# Patient Record
Sex: FEMALE | Race: WHITE | Employment: FULL TIME | URBAN - METROPOLITAN AREA
[De-identification: names, ages, dates, MRNs, and addresses within clinical notes are randomized per-mention and may not be internally consistent; named-entity substitution may affect disease eponyms.]

---

## 2018-01-15 PROBLEM — I10 ESSENTIAL HYPERTENSION: Status: ACTIVE | Noted: 2018-01-15

## 2018-01-15 PROBLEM — Z86.73 HISTORY OF CVA IN ADULTHOOD: Status: ACTIVE | Noted: 2018-01-15

## 2018-01-15 PROBLEM — M54.16 LUMBAR RADICULOPATHY: Status: ACTIVE | Noted: 2018-01-15

## 2018-01-15 PROBLEM — E11.9 TYPE 2 DIABETES MELLITUS WITHOUT COMPLICATION, WITHOUT LONG-TERM CURRENT USE OF INSULIN (HCC): Status: ACTIVE | Noted: 2018-01-15

## 2018-01-15 PROBLEM — E78.5 HYPERLIPIDEMIA, UNSPECIFIED HYPERLIPIDEMIA TYPE: Status: ACTIVE | Noted: 2018-01-15

## 2018-01-15 PROCEDURE — 82570 ASSAY OF URINE CREATININE: CPT | Performed by: INTERNAL MEDICINE

## 2018-01-15 PROCEDURE — 82043 UR ALBUMIN QUANTITATIVE: CPT | Performed by: INTERNAL MEDICINE

## 2019-04-10 PROBLEM — Z85.42 HISTORY OF ENDOMETRIAL CANCER: Status: ACTIVE | Noted: 2019-04-10

## 2020-05-07 ENCOUNTER — ANESTHESIA (OUTPATIENT)
Dept: SURGERY | Facility: HOSPITAL | Age: 55
DRG: 585 | End: 2020-05-07
Payer: COMMERCIAL

## 2020-05-07 ENCOUNTER — HOSPITAL ENCOUNTER (INPATIENT)
Facility: HOSPITAL | Age: 55
LOS: 2 days | Discharge: HOME | DRG: 585 | End: 2020-05-09
Attending: EMERGENCY MEDICINE | Admitting: INTERNAL MEDICINE
Payer: COMMERCIAL

## 2020-05-07 ENCOUNTER — ANESTHESIA EVENT (OUTPATIENT)
Dept: SURGERY | Facility: HOSPITAL | Age: 55
DRG: 585 | End: 2020-05-07
Payer: COMMERCIAL

## 2020-05-07 DIAGNOSIS — N61.1 ABSCESS OF RIGHT BREAST: ICD-10-CM

## 2020-05-07 DIAGNOSIS — N61.1 BREAST ABSCESS: ICD-10-CM

## 2020-05-07 DIAGNOSIS — E11.9 TYPE 2 DIABETES MELLITUS WITHOUT COMPLICATION, WITHOUT LONG-TERM CURRENT USE OF INSULIN (HCC): ICD-10-CM

## 2020-05-07 DIAGNOSIS — N61.0 CELLULITIS OF RIGHT BREAST: Primary | ICD-10-CM

## 2020-05-07 PROCEDURE — 99285 EMERGENCY DEPT VISIT HI MDM: CPT

## 2020-05-07 PROCEDURE — 0H9T0ZZ DRAINAGE OF RIGHT BREAST, OPEN APPROACH: ICD-10-PCS | Performed by: SURGERY

## 2020-05-07 PROCEDURE — 82962 GLUCOSE BLOOD TEST: CPT

## 2020-05-07 PROCEDURE — 88342 IMHCHEM/IMCYTCHM 1ST ANTB: CPT | Performed by: SURGERY

## 2020-05-07 PROCEDURE — 96372 THER/PROPH/DIAG INJ SC/IM: CPT

## 2020-05-07 PROCEDURE — 85025 COMPLETE CBC W/AUTO DIFF WBC: CPT | Performed by: EMERGENCY MEDICINE

## 2020-05-07 PROCEDURE — 85060 BLOOD SMEAR INTERPRETATION: CPT | Performed by: EMERGENCY MEDICINE

## 2020-05-07 PROCEDURE — 83036 HEMOGLOBIN GLYCOSYLATED A1C: CPT | Performed by: INTERNAL MEDICINE

## 2020-05-07 PROCEDURE — 88305 TISSUE EXAM BY PATHOLOGIST: CPT | Performed by: SURGERY

## 2020-05-07 PROCEDURE — 88304 TISSUE EXAM BY PATHOLOGIST: CPT | Performed by: SURGERY

## 2020-05-07 PROCEDURE — 80048 BASIC METABOLIC PNL TOTAL CA: CPT | Performed by: EMERGENCY MEDICINE

## 2020-05-07 PROCEDURE — 87070 CULTURE OTHR SPECIMN AEROBIC: CPT | Performed by: SURGERY

## 2020-05-07 PROCEDURE — 87205 SMEAR GRAM STAIN: CPT | Performed by: SURGERY

## 2020-05-07 PROCEDURE — 96374 THER/PROPH/DIAG INJ IV PUSH: CPT

## 2020-05-07 RX ORDER — MIDAZOLAM HYDROCHLORIDE 1 MG/ML
INJECTION INTRAMUSCULAR; INTRAVENOUS AS NEEDED
Status: DISCONTINUED | OUTPATIENT
Start: 2020-05-07 | End: 2020-05-07 | Stop reason: SURG

## 2020-05-07 RX ORDER — VANCOMYCIN 2 GRAM/500 ML IN 0.9 % SODIUM CHLORIDE INTRAVENOUS
25 ONCE
Status: COMPLETED | OUTPATIENT
Start: 2020-05-07 | End: 2020-05-08

## 2020-05-07 RX ORDER — MORPHINE SULFATE 4 MG/ML
2 INJECTION, SOLUTION INTRAMUSCULAR; INTRAVENOUS EVERY 10 MIN PRN
Status: DISCONTINUED | OUTPATIENT
Start: 2020-05-07 | End: 2020-05-07 | Stop reason: HOSPADM

## 2020-05-07 RX ORDER — MORPHINE SULFATE 10 MG/ML
6 INJECTION, SOLUTION INTRAMUSCULAR; INTRAVENOUS EVERY 10 MIN PRN
Status: DISCONTINUED | OUTPATIENT
Start: 2020-05-07 | End: 2020-05-07 | Stop reason: HOSPADM

## 2020-05-07 RX ORDER — HYDROMORPHONE HYDROCHLORIDE 1 MG/ML
0.2 INJECTION, SOLUTION INTRAMUSCULAR; INTRAVENOUS; SUBCUTANEOUS EVERY 5 MIN PRN
Status: DISCONTINUED | OUTPATIENT
Start: 2020-05-07 | End: 2020-05-07 | Stop reason: HOSPADM

## 2020-05-07 RX ORDER — HEPARIN SODIUM 5000 [USP'U]/ML
5000 INJECTION, SOLUTION INTRAVENOUS; SUBCUTANEOUS EVERY 12 HOURS SCHEDULED
Status: DISCONTINUED | OUTPATIENT
Start: 2020-05-08 | End: 2020-05-09

## 2020-05-07 RX ORDER — ASPIRIN 81 MG/1
81 TABLET ORAL DAILY
Status: DISCONTINUED | OUTPATIENT
Start: 2020-05-07 | End: 2020-05-09

## 2020-05-07 RX ORDER — INSULIN ASPART 100 [IU]/ML
0.15 INJECTION, SOLUTION INTRAVENOUS; SUBCUTANEOUS ONCE
Status: COMPLETED | OUTPATIENT
Start: 2020-05-07 | End: 2020-05-07

## 2020-05-07 RX ORDER — MORPHINE SULFATE 4 MG/ML
4 INJECTION, SOLUTION INTRAMUSCULAR; INTRAVENOUS EVERY 10 MIN PRN
Status: DISCONTINUED | OUTPATIENT
Start: 2020-05-07 | End: 2020-05-07 | Stop reason: HOSPADM

## 2020-05-07 RX ORDER — HYDROMORPHONE HYDROCHLORIDE 1 MG/ML
0.4 INJECTION, SOLUTION INTRAMUSCULAR; INTRAVENOUS; SUBCUTANEOUS EVERY 5 MIN PRN
Status: DISCONTINUED | OUTPATIENT
Start: 2020-05-07 | End: 2020-05-07 | Stop reason: HOSPADM

## 2020-05-07 RX ORDER — BUPIVACAINE HYDROCHLORIDE 2.5 MG/ML
INJECTION, SOLUTION EPIDURAL; INFILTRATION; INTRACAUDAL AS NEEDED
Status: DISCONTINUED | OUTPATIENT
Start: 2020-05-07 | End: 2020-05-07

## 2020-05-07 RX ORDER — PROCHLORPERAZINE EDISYLATE 5 MG/ML
5 INJECTION INTRAMUSCULAR; INTRAVENOUS ONCE AS NEEDED
Status: DISCONTINUED | OUTPATIENT
Start: 2020-05-07 | End: 2020-05-07 | Stop reason: HOSPADM

## 2020-05-07 RX ORDER — SODIUM CHLORIDE, SODIUM LACTATE, POTASSIUM CHLORIDE, CALCIUM CHLORIDE 600; 310; 30; 20 MG/100ML; MG/100ML; MG/100ML; MG/100ML
INJECTION, SOLUTION INTRAVENOUS CONTINUOUS PRN
Status: DISCONTINUED | OUTPATIENT
Start: 2020-05-07 | End: 2020-05-07 | Stop reason: SURG

## 2020-05-07 RX ORDER — SODIUM CHLORIDE, SODIUM LACTATE, POTASSIUM CHLORIDE, CALCIUM CHLORIDE 600; 310; 30; 20 MG/100ML; MG/100ML; MG/100ML; MG/100ML
INJECTION, SOLUTION INTRAVENOUS CONTINUOUS
Status: DISCONTINUED | OUTPATIENT
Start: 2020-05-07 | End: 2020-05-07 | Stop reason: HOSPADM

## 2020-05-07 RX ORDER — HYDROCODONE BITARTRATE AND ACETAMINOPHEN 5; 325 MG/1; MG/1
2 TABLET ORAL EVERY 4 HOURS PRN
Status: DISCONTINUED | OUTPATIENT
Start: 2020-05-07 | End: 2020-05-09

## 2020-05-07 RX ORDER — DEXTROSE MONOHYDRATE 25 G/50ML
50 INJECTION, SOLUTION INTRAVENOUS
Status: DISCONTINUED | OUTPATIENT
Start: 2020-05-07 | End: 2020-05-07

## 2020-05-07 RX ORDER — HYDROCODONE BITARTRATE AND ACETAMINOPHEN 5; 325 MG/1; MG/1
1 TABLET ORAL AS NEEDED
Status: DISCONTINUED | OUTPATIENT
Start: 2020-05-07 | End: 2020-05-07 | Stop reason: HOSPADM

## 2020-05-07 RX ORDER — HALOPERIDOL 5 MG/ML
0.25 INJECTION INTRAMUSCULAR ONCE AS NEEDED
Status: DISCONTINUED | OUTPATIENT
Start: 2020-05-07 | End: 2020-05-07 | Stop reason: HOSPADM

## 2020-05-07 RX ORDER — DEXTROSE MONOHYDRATE 25 G/50ML
50 INJECTION, SOLUTION INTRAVENOUS
Status: DISCONTINUED | OUTPATIENT
Start: 2020-05-07 | End: 2020-05-07 | Stop reason: HOSPADM

## 2020-05-07 RX ORDER — HYDROCODONE BITARTRATE AND ACETAMINOPHEN 5; 325 MG/1; MG/1
1 TABLET ORAL EVERY 4 HOURS PRN
Status: DISCONTINUED | OUTPATIENT
Start: 2020-05-07 | End: 2020-05-09

## 2020-05-07 RX ORDER — ENOXAPARIN SODIUM 100 MG/ML
40 INJECTION SUBCUTANEOUS DAILY
Status: DISCONTINUED | OUTPATIENT
Start: 2020-05-08 | End: 2020-05-07

## 2020-05-07 RX ORDER — ONDANSETRON 2 MG/ML
INJECTION INTRAMUSCULAR; INTRAVENOUS AS NEEDED
Status: DISCONTINUED | OUTPATIENT
Start: 2020-05-07 | End: 2020-05-07 | Stop reason: SURG

## 2020-05-07 RX ORDER — ONDANSETRON 2 MG/ML
4 INJECTION INTRAMUSCULAR; INTRAVENOUS ONCE AS NEEDED
Status: DISCONTINUED | OUTPATIENT
Start: 2020-05-07 | End: 2020-05-07 | Stop reason: HOSPADM

## 2020-05-07 RX ORDER — HEPARIN SODIUM 5000 [USP'U]/ML
5000 INJECTION, SOLUTION INTRAVENOUS; SUBCUTANEOUS EVERY 12 HOURS
Status: DISCONTINUED | OUTPATIENT
Start: 2020-05-08 | End: 2020-05-07

## 2020-05-07 RX ORDER — NALOXONE HYDROCHLORIDE 0.4 MG/ML
80 INJECTION, SOLUTION INTRAMUSCULAR; INTRAVENOUS; SUBCUTANEOUS AS NEEDED
Status: DISCONTINUED | OUTPATIENT
Start: 2020-05-07 | End: 2020-05-07 | Stop reason: HOSPADM

## 2020-05-07 RX ORDER — DEXAMETHASONE SODIUM PHOSPHATE 4 MG/ML
VIAL (ML) INJECTION AS NEEDED
Status: DISCONTINUED | OUTPATIENT
Start: 2020-05-07 | End: 2020-05-07 | Stop reason: SURG

## 2020-05-07 RX ORDER — HYDROMORPHONE HYDROCHLORIDE 1 MG/ML
0.6 INJECTION, SOLUTION INTRAMUSCULAR; INTRAVENOUS; SUBCUTANEOUS EVERY 5 MIN PRN
Status: DISCONTINUED | OUTPATIENT
Start: 2020-05-07 | End: 2020-05-07 | Stop reason: HOSPADM

## 2020-05-07 RX ORDER — LIDOCAINE HYDROCHLORIDE 10 MG/ML
INJECTION, SOLUTION EPIDURAL; INFILTRATION; INTRACAUDAL; PERINEURAL AS NEEDED
Status: DISCONTINUED | OUTPATIENT
Start: 2020-05-07 | End: 2020-05-07 | Stop reason: SURG

## 2020-05-07 RX ORDER — ATORVASTATIN CALCIUM 10 MG/1
10 TABLET, FILM COATED ORAL EVERY EVENING
Status: DISCONTINUED | OUTPATIENT
Start: 2020-05-07 | End: 2020-05-09

## 2020-05-07 RX ORDER — LISINOPRIL 5 MG/1
5 TABLET ORAL DAILY
Status: DISCONTINUED | OUTPATIENT
Start: 2020-05-08 | End: 2020-05-09

## 2020-05-07 RX ORDER — HYDROCODONE BITARTRATE AND ACETAMINOPHEN 5; 325 MG/1; MG/1
2 TABLET ORAL AS NEEDED
Status: DISCONTINUED | OUTPATIENT
Start: 2020-05-07 | End: 2020-05-07 | Stop reason: HOSPADM

## 2020-05-07 RX ORDER — ACETAMINOPHEN 325 MG/1
650 TABLET ORAL EVERY 4 HOURS PRN
Status: DISCONTINUED | OUTPATIENT
Start: 2020-05-07 | End: 2020-05-09

## 2020-05-07 RX ORDER — SODIUM CHLORIDE 0.9 % (FLUSH) 0.9 %
3 SYRINGE (ML) INJECTION AS NEEDED
Status: DISCONTINUED | OUTPATIENT
Start: 2020-05-07 | End: 2020-05-09

## 2020-05-07 RX ADMIN — SODIUM CHLORIDE, SODIUM LACTATE, POTASSIUM CHLORIDE, CALCIUM CHLORIDE: 600; 310; 30; 20 INJECTION, SOLUTION INTRAVENOUS at 16:44:00

## 2020-05-07 RX ADMIN — DEXAMETHASONE SODIUM PHOSPHATE 4 MG: 4 MG/ML VIAL (ML) INJECTION at 16:01:00

## 2020-05-07 RX ADMIN — LIDOCAINE HYDROCHLORIDE 50 MG: 10 INJECTION, SOLUTION EPIDURAL; INFILTRATION; INTRACAUDAL; PERINEURAL at 15:56:00

## 2020-05-07 RX ADMIN — SODIUM CHLORIDE, SODIUM LACTATE, POTASSIUM CHLORIDE, CALCIUM CHLORIDE: 600; 310; 30; 20 INJECTION, SOLUTION INTRAVENOUS at 15:30:00

## 2020-05-07 RX ADMIN — ONDANSETRON 4 MG: 2 INJECTION INTRAMUSCULAR; INTRAVENOUS at 16:18:00

## 2020-05-07 RX ADMIN — MIDAZOLAM HYDROCHLORIDE 2 MG: 1 INJECTION INTRAMUSCULAR; INTRAVENOUS at 15:56:00

## 2020-05-07 NOTE — CONSULTS
[facility] AND CLINICS  [facility] Infectious Disease  Report of Consultation    Brisas [address] Patient Status:  Inpatient    1965 MRN [id number]   Location [facility] 5SW/SE Attending Azalia Manual, [address] [facility] Day # 0 PCP MD SONI Benítez atorvastatin (LIPITOR) tab 10 mg, 10 mg, Oral, QPM  •  [START ON 5/8/2020] lisinopril tab 5 mg, 5 mg, Oral, Daily  •  Normal Saline Flush 0.9 % injection 3 mL, 3 mL, Intravenous, PRN  •  glucose (DEX4) oral liquid 15 g, 15 g, Oral, Q15 Min PRN **OR** Gluco thyroid disorder. Remainder of 12 point review of systems otherwise negative.     Vital signs in last 24 hours:  Patient Vitals for the past 24 hrs:   BP Temp Temp src Pulse Resp SpO2 Height Weight   05/07/20 1317 130/83 98.3 °F (36.8 °C) Oral 97 18 97 % regimen and follow-up ultrasound in 6 weeks after completion of antibiotics to evaluate for resolution of abscess, and enlarged lymph node. Assessment and Plan:    1.   Complicated, refractory R breast abscess  - h/o cultures 4/23 with GBS, failed to res

## 2020-05-07 NOTE — H&P
TAYLOR Sullivan 59 Patient Status:  Emergency    1965 MRN [id number]   Location [address] Attending Dominick Hidalgo MD   Hosp Day # 0 PCP Yi Adams MD     Date: Allergies: No Known Allergies  (Not in a hospital admission)      Review of Systems:   A comprehensive 12 point review of systems was otherwise negative, aside from what's stated above.     Physical Exam:   Vital Signs:  Blood pressure (!) 134/92, pulse 9 to assess efficacy of antibiotic therapy or as clinically indicated is recommended. Patient was informed of the examination results and recommendations upon completion of the study. This was interpreted by Michelel Wilson. Mariam Arita M.D.           Assessment/Plan:

## 2020-05-07 NOTE — ED PROVIDER NOTES
Patient Seen in: [facility] AND [facility] Emergency Department      History   Patient presents with:  Cellulitis    Stated Complaint: breast infection    HPI    51-year-old female with history of hypertension, diabetes, hyperlipidemia, and prior CVA presents wi systems reviewed and negative except as noted above.     Physical Exam     ED Triage Vitals   BP 05/07/20 1123 (!) 140/107   Pulse 05/07/20 1123 100   Resp 05/07/20 1123 16   Temp 05/07/20 1121 98.7 °F (37.1 °C)   Temp src 05/07/20 1121 Oral   SpO2 05/07/20 ---------                               -----------         ------                     CBC W/ DIFFERENTIAL[[id number]]          Abnormal            Preliminary result           Please view results for these tests on the individual orders.

## 2020-05-07 NOTE — ED NOTES
Orders for admission, patient is aware of plan and ready to go upstairs. Any questions, please call ED CYDNEY nathan  at extension [phone]. Alert and oriented x4 from home, ambulatory. Right breast cellulitis not improved after 2 courses of antibiotics at home.

## 2020-05-07 NOTE — ED INITIAL ASSESSMENT (HPI)
Sent by Dr. Art Archer for worsening right breast cellulitis. Patient with these issues x 3 weeks, completed 2 rounds of abx. Denies fever, drainage.  Pain 6/10

## 2020-05-07 NOTE — CONSULTS
[facility]    Report of Consultation    Brisas [address]80 Patient Status:  Inpatient    1965 MRN [id number]   Location [facility] 5SW/SE Attending Litzy Munoz, DO   Hosp Day # 0 PCP Kevyn Sanchez MD     Date of Admiss Oral, Daily  •  Normal Saline Flush 0.9 % injection 3 mL, 3 mL, Intravenous, PRN  •  glucose (DEX4) oral liquid 15 g, 15 g, Oral, Q15 Min PRN **OR** Glucose-Vitamin C (DEX-4) chewable tab 4 tablet, 4 tablet, Oral, Q15 Min PRN **OR** dextrose 50 % injection largest most medial and almost just superior to nipple  Areas all tender  Neurologic: Cranial nerves are grossly intact. Motor strength and sensory examination is grossly normal.  No focal neurologic deficit.     Laboratory Data:  Lab Results   Component V

## 2020-05-07 NOTE — ED NOTES
Spoke with dolores garcia on floor, informed of pt just receiving insulin sq, she will check blood glucose upstairs. Transport took pt to floor.

## 2020-05-07 NOTE — ANESTHESIA PREPROCEDURE EVALUATION
Anesthesia PreOp Note    HPI:     Rachelle Villela is a 47year old female who presents for preoperative consultation requested by: Erlinda Stahl MD    Date of Surgery: 5/7/2020    Procedure(s):  EXTREMITY UPPER INCISION & DRAINAGE  Indication: Breast abscess daily., Disp: 30 tablet, Rfl: 5, 5/7/2020 at Unknown time  lisinopril 5 MG Oral Tab, Take 1 tablet (5 mg total) by mouth daily. , Disp: 30 tablet, Rfl: 11, 5/7/2020 at Unknown time  metFORMIN HCl 1000 MG Oral Tab, Take 1 tablet (1,000 mg total) by mouth 2 ( 1-11 Units, 1-11 Units, Subcutaneous, 4 times per day, Galina White, DO    No current [facility]-ordered outpatient medications on file.         Seafood                 HIVES    Family History   Problem Relation Age of Onset   • Diabetes Mother    • Other (cv 05/07/2020    HCT 46.4 05/07/2020    MCV 83.6 05/07/2020    MCH 29.4 05/07/2020    MCHC 35.1 05/07/2020    RDW 12.6 05/07/2020    .0 (H) 05/07/2020     Lab Results   Component Value Date     (L) 05/07/2020    K 4.3 05/07/2020     05/07/2

## 2020-05-07 NOTE — BRIEF OP NOTE
Pre-Operative Diagnosis:  Right Breast abscess [N61.1]     Post-Operative Diagnosis: right breast abscess, complex     Procedure Performed:   Procedure(s):  incision and drainage right breast abscess  Biopsy of right breast    Surgeon(s) and Role:     * Ha

## 2020-05-07 NOTE — ANESTHESIA POSTPROCEDURE EVALUATION
Patient: Desire Cordova    Procedure Summary     Date:  05/07/20 Room / Location:  [facility] OR  / [facility] OR    Anesthesia Start:  0802 Anesthesia Stop:      Procedure:  EXTREMITY UPPER INCISION & DRAINAGE (Right ) Diagnosis:       Breast abscess      (Br

## 2020-05-08 PROCEDURE — 82962 GLUCOSE BLOOD TEST: CPT

## 2020-05-08 PROCEDURE — 80048 BASIC METABOLIC PNL TOTAL CA: CPT | Performed by: SURGERY

## 2020-05-08 PROCEDURE — 85025 COMPLETE CBC W/AUTO DIFF WBC: CPT | Performed by: SURGERY

## 2020-05-08 PROCEDURE — 83735 ASSAY OF MAGNESIUM: CPT | Performed by: SURGERY

## 2020-05-08 RX ORDER — DEXTROSE MONOHYDRATE 25 G/50ML
50 INJECTION, SOLUTION INTRAVENOUS
Status: DISCONTINUED | OUTPATIENT
Start: 2020-05-08 | End: 2020-05-09

## 2020-05-08 NOTE — CM/SW NOTE
Per nursing rounds, pt is from home, independent at baseline. Per ID note/Dr. Janelle Collier, cultures are still pending, tentative plan is to discharge on PO abx. SW/CM to remain available for support and/or discharge planning.      [address] Reji Silva, MELISSA X1

## 2020-05-08 NOTE — PROGRESS NOTES
[facility] AND [facility] Infectious Disease  Progress Note    Jailene Lagunas Patient Status:  Inpatient    1965 MRN [id number]   Location [facility] 5SW/SE Attending Donavan David, [address] [facility] Day # 1 PCP Diana Vaughan MD     Subjective: after completion of antibiotics to evaluate for resolution of abscess, and enlarged lymph node.     Assessment and Plan:     1.   Complicated, refractory R breast abscess  - h/o cultures 4/23 with GBS, failed to respond to p.o. augmentin  - s/p aspiration o

## 2020-05-08 NOTE — PLAN OF CARE
Problem: Patient Centered Care  Goal: Patient preferences are identified and integrated in the patient's plan of care  Description  Interventions:  - What would you like us to know as we care for you?  \"I lost my mother not too long ago\"  - Provide time

## 2020-05-08 NOTE — PROGRESS NOTES
[place] [facility] - [facility]    Progress Note    Brisas [address] Patient Status:  Inpatient    1965 MRN [id number]   Location [facility] 5SW/SE Attending Giovanni Morgan, [address] [facility] Day # 1 PCP Yen Disla MD            Subjective:     PT cellultis]  Pt still with fair amt pain  prob discharge Mustapha VIGIL [facility]  General Surgery  [facility]  5/8/2020  12:47 PM

## 2020-05-08 NOTE — PROGRESS NOTES
[facility] Hospitalist Progress Note   CC: follow-up hospital admission    SUBJECTIVE:  Interval History:   - mild pain   - still draining   - no fevers, chills    - was off dm meds for months, recently restarted, is interested in long acting insulin for home  105   CO2 22.0 26.0   BUN 13 9   MG  --  1.7       Coagulation:   No results found for: PROTIME, INR, PTT    ASSESSMENT/PLAN:  Codie Vasquez is a(n) 47year old female w/ PMHx of CVA (no deficits), DM2, HTN, Obesity BMI 38, hx of endometrial CA s/p

## 2020-05-08 NOTE — DIABETES ED
[facility]    Diabetes Education  Note    Brisas 8080 Patient Status:  Inpatient   1965 MRN [id number]  Location [facility] 5SW/SE Attending Jimena Angel Day # 1 PCP Vincent Villasenor MD    Reason for Visit:M Ye Burden, RN  Inpatient Diabetes Educator  5/8/2020  3:41 PM

## 2020-05-09 VITALS
OXYGEN SATURATION: 95 % | RESPIRATION RATE: 16 BRPM | WEIGHT: 211.88 LBS | BODY MASS INDEX: 38.99 KG/M2 | HEIGHT: 62 IN | DIASTOLIC BLOOD PRESSURE: 87 MMHG | SYSTOLIC BLOOD PRESSURE: 145 MMHG | HEART RATE: 79 BPM | TEMPERATURE: 98 F

## 2020-05-09 PROCEDURE — 82962 GLUCOSE BLOOD TEST: CPT

## 2020-05-09 PROCEDURE — 85025 COMPLETE CBC W/AUTO DIFF WBC: CPT | Performed by: SURGERY

## 2020-05-09 PROCEDURE — 80048 BASIC METABOLIC PNL TOTAL CA: CPT | Performed by: SURGERY

## 2020-05-09 PROCEDURE — 83735 ASSAY OF MAGNESIUM: CPT | Performed by: SURGERY

## 2020-05-09 RX ORDER — HYDROCODONE BITARTRATE AND ACETAMINOPHEN 5; 325 MG/1; MG/1
1 TABLET ORAL EVERY 6 HOURS PRN
Qty: 10 TABLET | Refills: 0 | Status: SHIPPED | OUTPATIENT
Start: 2020-05-09 | End: 2020-11-13 | Stop reason: ALTCHOICE

## 2020-05-09 RX ORDER — BLOOD-GLUCOSE METER
EACH MISCELLANEOUS
Qty: 1 KIT | Refills: 0 | Status: SHIPPED | OUTPATIENT
Start: 2020-05-09 | End: 2021-03-03

## 2020-05-09 RX ORDER — CEFUROXIME AXETIL 500 MG/1
500 TABLET ORAL 2 TIMES DAILY
Qty: 20 TABLET | Refills: 0 | Status: SHIPPED | OUTPATIENT
Start: 2020-05-09 | End: 2020-05-19

## 2020-05-09 RX ORDER — BLOOD SUGAR DIAGNOSTIC
STRIP MISCELLANEOUS
Qty: 50 STRIP | Refills: 6 | Status: SHIPPED | OUTPATIENT
Start: 2020-05-09 | End: 2021-03-03

## 2020-05-09 NOTE — PLAN OF CARE
Problem: Patient Centered Care  Goal: Patient preferences are identified and integrated in the patient's plan of care  Description  Interventions:  - What would you like us to know as we care for you?  I am independent   - Provide timely, complete, and ac surgeon.

## 2020-05-09 NOTE — PLAN OF CARE
Pt being discharged with insulin. New to insulin. Seen by Diabetes educator today. Pt did own injection with minimal direction. Have pt check own blood sugars.      Problem: Diabetes/Glucose Control  Goal: Glucose maintained within prescribed range  Descrip

## 2020-05-09 NOTE — PLAN OF CARE
Problem: Patient Centered Care  Goal: Patient preferences are identified and integrated in the patient's plan of care  Description  Interventions:  - What would you like us to know as we care for you?  I am independent   - Provide timely, complete, and ac

## 2020-05-09 NOTE — DISCHARGE SUMMARY
[place] [facility] - [facility]    Discharge Summary    Brisas 8080 Patient Status:  Inpatient    1965 MRN [id number]   Location [facility] 5SW/SE Attending Conrad Calvillo, [address] [facility] Day # 2 PCP James Gupta MD     Date of Admission:  up      Hx of CVA - no deficits   - aspirin, statin, bg control      DM2 - uncontrolled   - a1c 12.0  - she was off of her hypoglycemic agents Clayborn Young and metformin) for months, then restarted recently   - we discussed options of lifestyle plus additional noted.  Integument: No lesions. No erythema. Psychiatric: Appropriate mood and affect.     Discharge Plan:   Discharge Condition: Good    Current Discharge Medication List    New Orders    Cefuroxime Axetil 500 MG Oral Tab  Take 1 tablet (500 mg total) by nightly. Check sugars first thing in the morning, if greater than 200 for 3 consecutive days, increase your lantus to 28 units nightly. If greater than 300 for 3 consecutive days, increase to 32units nightly. If greater than 400, please call Dr. Andrei Man.

## 2020-05-09 NOTE — PROGRESS NOTES
[place] [facility] - [facility]    Progress Note    Brisas 8080 Patient Status:  Inpatient    1965 MRN [id number]   Location [facility] 5SW/SE Attending Azalia Toledo, 160[address] [facility] Day # 2 PCP Rin Gamino MD            Subjective:     mi cellultis]  Pain better  Wound Good  Home today        ,Donal Gottron MD [facility]  General Surgery  [facility]  5/9/2020  8:00 AM

## 2020-05-09 NOTE — PROGRESS NOTES
[facility] AND [facility] Infectious Disease Progress Note    Geanie Moment Patient Status:  Inpatient    1965 MRN [id number]   Location [facility] 5SW/SE Attending Loulou Barksdale, 160[address] [facility] Day # 2 PCP Naina Chiang MD     Subjective: oz (96.1 kg), SpO2 95 %. Temp (24hrs), Av.2 °F (36.8 °C), Min:97.9 °F (36.6 °C), Max:98.6 °F (37 °C)      HEENT: Exam is unremarkable. Without scleral icterus. Mucous membranes are moist. PERRLA. Oropharynx is clear.   Neck: No tenderness to palpit

## 2020-05-11 ENCOUNTER — TELEPHONE (OUTPATIENT)
Dept: MEDSURG UNIT | Facility: HOSPITAL | Age: 55
End: 2020-05-11

## 2020-05-13 NOTE — OPERATIVE REPORT
[facility]    PATIENT'S NAME: Fililakia Luis   ATTENDING PHYSICIAN: Mulu Cotter DO   OPERATING PHYSICIAN: Jose Cruz Meehan MD   PATIENT ACCOUNT#:   [id number]    LOCATION:  [address] [place] [address] #:   [id number]       DATE OF BIRTH:  [birth year] Multiple biopsies were taken. Appeared to be most likely granulation tissue that had been there for a little while. We inspected. There were no other masses.   After hemostasis was assured, after vigorous irrigation, we placed a Penrose drain through the

## 2020-10-26 ENCOUNTER — HOSPITAL ENCOUNTER (OUTPATIENT)
Dept: ULTRASOUND IMAGING | Facility: HOSPITAL | Age: 55
Discharge: HOME | End: 2020-10-26
Attending: SURGERY
Payer: COMMERCIAL

## 2020-10-26 ENCOUNTER — HOSPITAL ENCOUNTER (OUTPATIENT)
Dept: MAMMOGRAPHY | Facility: HOSPITAL | Age: 55
Discharge: HOME | End: 2020-10-26
Attending: SURGERY
Payer: COMMERCIAL

## 2020-10-26 DIAGNOSIS — N61.1 ABSCESS OF RIGHT BREAST: ICD-10-CM

## 2020-10-26 DIAGNOSIS — N61.0 CELLULITIS OF RIGHT BREAST: ICD-10-CM

## 2020-10-26 PROCEDURE — 76642 ULTRASOUND BREAST LIMITED: CPT | Performed by: SURGERY

## 2020-10-26 PROCEDURE — 77062 BREAST TOMOSYNTHESIS BI: CPT | Performed by: SURGERY

## 2020-10-26 PROCEDURE — 77066 DX MAMMO INCL CAD BI: CPT | Performed by: SURGERY

## 2021-03-03 PROBLEM — R20.0 NUMBNESS AND TINGLING IN LEFT HAND: Status: ACTIVE | Noted: 2021-03-03

## 2021-03-03 PROBLEM — N61.0 CELLULITIS OF BREAST: Status: RESOLVED | Noted: 2020-05-07 | Resolved: 2021-03-03

## 2021-03-03 PROBLEM — R20.2 NUMBNESS AND TINGLING IN LEFT HAND: Status: ACTIVE | Noted: 2021-03-03

## 2021-03-03 PROBLEM — N61.0 CELLULITIS OF RIGHT BREAST: Status: RESOLVED | Noted: 2020-05-07 | Resolved: 2021-03-03

## 2021-03-03 PROBLEM — N61.1 ABSCESS OF RIGHT BREAST: Status: RESOLVED | Noted: 2020-05-07 | Resolved: 2021-03-03

## 2021-04-03 ENCOUNTER — HOSPITAL ENCOUNTER (INPATIENT)
Facility: HOSPITAL | Age: 56
LOS: 3 days | Discharge: HOME | DRG: 177 | End: 2021-04-07
Attending: EMERGENCY MEDICINE | Admitting: HOSPITALIST
Payer: COMMERCIAL

## 2021-04-03 ENCOUNTER — APPOINTMENT (OUTPATIENT)
Dept: ULTRASOUND IMAGING | Facility: HOSPITAL | Age: 56
DRG: 177 | End: 2021-04-03
Attending: HOSPITALIST
Payer: COMMERCIAL

## 2021-04-03 ENCOUNTER — APPOINTMENT (OUTPATIENT)
Dept: CT IMAGING | Facility: HOSPITAL | Age: 56
DRG: 177 | End: 2021-04-03
Attending: EMERGENCY MEDICINE
Payer: COMMERCIAL

## 2021-04-03 ENCOUNTER — APPOINTMENT (OUTPATIENT)
Dept: GENERAL RADIOLOGY | Facility: HOSPITAL | Age: 56
DRG: 177 | End: 2021-04-03
Payer: COMMERCIAL

## 2021-04-03 DIAGNOSIS — J96.00 ACUTE RESPIRATORY FAILURE DUE TO COVID-19 (HCC): Primary | ICD-10-CM

## 2021-04-03 DIAGNOSIS — J12.82 PNEUMONIA DUE TO COVID-19 VIRUS: ICD-10-CM

## 2021-04-03 DIAGNOSIS — U07.1 ACUTE RESPIRATORY FAILURE DUE TO COVID-19 (HCC): Primary | ICD-10-CM

## 2021-04-03 DIAGNOSIS — U07.1 PNEUMONIA DUE TO COVID-19 VIRUS: ICD-10-CM

## 2021-04-03 PROCEDURE — 71045 X-RAY EXAM CHEST 1 VIEW: CPT | Performed by: EMERGENCY MEDICINE

## 2021-04-03 PROCEDURE — 96375 TX/PRO/DX INJ NEW DRUG ADDON: CPT

## 2021-04-03 PROCEDURE — 93970 EXTREMITY STUDY: CPT | Performed by: HOSPITALIST

## 2021-04-03 PROCEDURE — 81001 URINALYSIS AUTO W/SCOPE: CPT | Performed by: EMERGENCY MEDICINE

## 2021-04-03 PROCEDURE — 93010 ELECTROCARDIOGRAM REPORT: CPT | Performed by: EMERGENCY MEDICINE

## 2021-04-03 PROCEDURE — 84145 PROCALCITONIN (PCT): CPT | Performed by: EMERGENCY MEDICINE

## 2021-04-03 PROCEDURE — 94640 AIRWAY INHALATION TREATMENT: CPT

## 2021-04-03 PROCEDURE — 85379 FIBRIN DEGRADATION QUANT: CPT | Performed by: EMERGENCY MEDICINE

## 2021-04-03 PROCEDURE — 86140 C-REACTIVE PROTEIN: CPT | Performed by: EMERGENCY MEDICINE

## 2021-04-03 PROCEDURE — 82962 GLUCOSE BLOOD TEST: CPT

## 2021-04-03 PROCEDURE — 80048 BASIC METABOLIC PNL TOTAL CA: CPT | Performed by: EMERGENCY MEDICINE

## 2021-04-03 PROCEDURE — 71260 CT THORAX DX C+: CPT | Performed by: EMERGENCY MEDICINE

## 2021-04-03 PROCEDURE — 83036 HEMOGLOBIN GLYCOSYLATED A1C: CPT | Performed by: HOSPITALIST

## 2021-04-03 PROCEDURE — 80076 HEPATIC FUNCTION PANEL: CPT | Performed by: EMERGENCY MEDICINE

## 2021-04-03 PROCEDURE — 93005 ELECTROCARDIOGRAM TRACING: CPT

## 2021-04-03 PROCEDURE — XW033E5 INTRODUCTION OF REMDESIVIR ANTI-INFECTIVE INTO PERIPHERAL VEIN, PERCUTANEOUS APPROACH, NEW TECHNOLOGY GROUP 5: ICD-10-PCS | Performed by: INTERNAL MEDICINE

## 2021-04-03 PROCEDURE — 3E0333Z INTRODUCTION OF ANTI-INFLAMMATORY INTO PERIPHERAL VEIN, PERCUTANEOUS APPROACH: ICD-10-PCS | Performed by: EMERGENCY MEDICINE

## 2021-04-03 PROCEDURE — 82728 ASSAY OF FERRITIN: CPT | Performed by: EMERGENCY MEDICINE

## 2021-04-03 PROCEDURE — 84484 ASSAY OF TROPONIN QUANT: CPT | Performed by: EMERGENCY MEDICINE

## 2021-04-03 PROCEDURE — 83880 ASSAY OF NATRIURETIC PEPTIDE: CPT | Performed by: EMERGENCY MEDICINE

## 2021-04-03 PROCEDURE — 99285 EMERGENCY DEPT VISIT HI MDM: CPT

## 2021-04-03 PROCEDURE — 82550 ASSAY OF CK (CPK): CPT | Performed by: EMERGENCY MEDICINE

## 2021-04-03 PROCEDURE — 85025 COMPLETE CBC W/AUTO DIFF WBC: CPT | Performed by: EMERGENCY MEDICINE

## 2021-04-03 PROCEDURE — 83615 LACTATE (LD) (LDH) ENZYME: CPT | Performed by: EMERGENCY MEDICINE

## 2021-04-03 PROCEDURE — 96374 THER/PROPH/DIAG INJ IV PUSH: CPT

## 2021-04-03 RX ORDER — GUAIFENESIN 600 MG
600 TABLET, EXTENDED RELEASE 12 HR ORAL 2 TIMES DAILY
Status: DISCONTINUED | OUTPATIENT
Start: 2021-04-03 | End: 2021-04-07

## 2021-04-03 RX ORDER — LISINOPRIL 5 MG/1
5 TABLET ORAL DAILY
Status: DISCONTINUED | OUTPATIENT
Start: 2021-04-04 | End: 2021-04-07

## 2021-04-03 RX ORDER — ALBUTEROL SULFATE 90 UG/1
4 AEROSOL, METERED RESPIRATORY (INHALATION) EVERY 4 HOURS PRN
Status: DISCONTINUED | OUTPATIENT
Start: 2021-04-03 | End: 2021-04-07

## 2021-04-03 RX ORDER — ASPIRIN 81 MG/1
81 TABLET ORAL DAILY
Status: DISCONTINUED | OUTPATIENT
Start: 2021-04-03 | End: 2021-04-07

## 2021-04-03 RX ORDER — ATORVASTATIN CALCIUM 10 MG/1
10 TABLET, FILM COATED ORAL EVERY EVENING
Status: DISCONTINUED | OUTPATIENT
Start: 2021-04-03 | End: 2021-04-07

## 2021-04-03 RX ORDER — DEXAMETHASONE SODIUM PHOSPHATE 10 MG/ML
10 INJECTION, SOLUTION INTRAMUSCULAR; INTRAVENOUS ONCE
Status: COMPLETED | OUTPATIENT
Start: 2021-04-03 | End: 2021-04-03

## 2021-04-03 RX ORDER — ACETAMINOPHEN 325 MG/1
650 TABLET ORAL EVERY 6 HOURS PRN
Status: DISCONTINUED | OUTPATIENT
Start: 2021-04-03 | End: 2021-04-07

## 2021-04-03 RX ORDER — DEXAMETHASONE SODIUM PHOSPHATE 4 MG/ML
6 VIAL (ML) INJECTION DAILY
Status: DISCONTINUED | OUTPATIENT
Start: 2021-04-04 | End: 2021-04-07

## 2021-04-03 RX ORDER — ZINC SULFATE 50(220)MG
220 CAPSULE ORAL 2 TIMES DAILY
Status: DISCONTINUED | OUTPATIENT
Start: 2021-04-03 | End: 2021-04-07

## 2021-04-03 RX ORDER — DIPHENHYDRAMINE HYDROCHLORIDE 50 MG/ML
INJECTION INTRAMUSCULAR; INTRAVENOUS
Status: COMPLETED
Start: 2021-04-03 | End: 2021-04-03

## 2021-04-03 RX ORDER — DEXTROSE MONOHYDRATE 25 G/50ML
50 INJECTION, SOLUTION INTRAVENOUS
Status: DISCONTINUED | OUTPATIENT
Start: 2021-04-03 | End: 2021-04-07

## 2021-04-03 RX ORDER — ENOXAPARIN SODIUM 100 MG/ML
0.5 INJECTION SUBCUTANEOUS DAILY
Status: DISCONTINUED | OUTPATIENT
Start: 2021-04-03 | End: 2021-04-07

## 2021-04-03 RX ORDER — BENZONATATE 200 MG/1
200 CAPSULE ORAL 3 TIMES DAILY PRN
Status: DISCONTINUED | OUTPATIENT
Start: 2021-04-03 | End: 2021-04-07

## 2021-04-03 RX ORDER — DIPHENHYDRAMINE HYDROCHLORIDE 50 MG/ML
25 INJECTION INTRAMUSCULAR; INTRAVENOUS ONCE
Status: COMPLETED | OUTPATIENT
Start: 2021-04-03 | End: 2021-04-03

## 2021-04-03 RX ORDER — ONDANSETRON 2 MG/ML
4 INJECTION INTRAMUSCULAR; INTRAVENOUS EVERY 6 HOURS PRN
Status: DISCONTINUED | OUTPATIENT
Start: 2021-04-03 | End: 2021-04-07

## 2021-04-03 RX ORDER — DEXAMETHASONE 6 MG/1
6 TABLET ORAL DAILY
Status: DISCONTINUED | OUTPATIENT
Start: 2021-04-04 | End: 2021-04-07

## 2021-04-03 RX ORDER — MELATONIN
3 NIGHTLY PRN
Status: DISCONTINUED | OUTPATIENT
Start: 2021-04-03 | End: 2021-04-07

## 2021-04-03 RX ORDER — ASCORBIC ACID 500 MG
1000 TABLET ORAL DAILY
Status: DISCONTINUED | OUTPATIENT
Start: 2021-04-03 | End: 2021-04-07

## 2021-04-03 RX ORDER — ACETAMINOPHEN 500 MG
1000 TABLET ORAL ONCE
Status: COMPLETED | OUTPATIENT
Start: 2021-04-03 | End: 2021-04-03

## 2021-04-03 NOTE — PLAN OF CARE
Problem: Patient Centered Care  Goal: Patient preferences are identified and integrated in the patient's plan of care  Description: Interventions:  - What would you like us to know as we care for you?  I am a short of breath when I walk  - Provide timely, Provide Smoking Cessation handout, if applicable  - Encourage broncho-pulmonary hygiene including cough, deep breathe, Incentive Spirometry  - Assess the need for suctioning and perform as needed  - Assess and instruct to report SOB or any respiratory diff

## 2021-04-03 NOTE — ED PROVIDER NOTES
Patient Seen in: [facility] AND [facility] Emergency Department      History   Patient presents with:  Difficulty Breathing    Stated Complaint: COVID/SOB    HPI/Subjective:   HPI    54year old female with a past medical history of CVA, diabetes, endometrial ca HPI.  Constitutional and vital signs reviewed. All other systems reviewed and negative except as noted above.     Physical Exam     ED Triage Vitals   BP 04/03/21 1009 147/79   Pulse 04/03/21 1009 87   Resp 04/03/21 1009 22   Temp 04/03/21 1016 (!) 100 neck supple. No rigidity. Normal range of motion. Right lower leg: No swelling. No edema. Left lower leg: No swelling. No edema. Skin:     General: Skin is warm and dry. Coloration: Skin is not jaundiced or pale. Findings: No rash. DIFFERENTIAL - Abnormal; Notable for the following components:    WBC 3.6 (*)     Lymphocyte Absolute 0.59 (*)     All other components within normal limits   TROPONIN I - Normal   CBC WITH DIFFERENTIAL WITH PLATELET    Narrative:      The following orders reviewed by myself and findings discussed with patient including need for follow up    Critical Care:  I spent a total of 30 minutes of critical care time in obtaining history, performing a physical exam, bedside monitoring of interventions, collecting and

## 2021-04-03 NOTE — H&P
[facility] Hospitalist H&P       CC: Patient presents with:  Difficulty Breathing       PCP: Yen Disla MD    History of Present Illness: Patient is a 54year old female with PMH sig for hs of stroke, DM2 on insulin, HTN, HLD, hs of endometrial CA s/p Review of Systems  Comprehensive ROS reviewed and negative except for what's stated above.      OBJECTIVE:  /71   Pulse 79   Temp (!) 100.5 °F (38.1 °C) (Oral)   Resp 24   SpO2 97%   General:  Alert, no distress   Head:  Normocephalic, without o Partially visualized at least moderate hepatic splenomegaly. 4. Lesser incidental findings as above.      Dictated by (CST): Trang Mcmillan MD on 4/03/2021 at 12:05 PM     Finalized by (CST): Trang Mcmillan MD on 4/03/2021 at 12:16 PM              ASSESSMENT

## 2021-04-03 NOTE — PROGRESS NOTES
[facility] Pharmacy Note:  Ivonne Mars is a 54year old patient admitted 4/3/2021 10:12 AM who is COVID (+) and has been been started on Remdesivir as of 4/3.     Pertinent Patient Lab Values:  Estimated GFR:   Recent Labs     04/03/21  1030   GFRAA

## 2021-04-03 NOTE — CONSULTS
[facility] AND [facility] Infectious Disease Consult    Brisas [address]68 Patient Status:  Emergency    1965 MRN [id number]   Location [address] Attending Celso Garza MD   Hosp Day # 0 PCP Jennifer Alvarez MD     Reason f Negative for anorexia, chills, fatigue, fevers, malaise, night sweats and weight loss. Eyes: Negative for visual disturbance, irritation and redness.   Ears, nose, mouth, throat, and face: Negative for hearing loss, tinnitus, nasal congestion, snoring, sor thyromegaly. Lungs: CTAB, normal and equal bilateral chest rise   Chest wall: No tenderness or deformity. Heart: Regular rate and rhythm, normal S1S2, no murmur.    Abdomen: soft, non-tender, non-distended, no masses, no guarding, no     rebound, positi 0.65*  --    CRP 16.70*  --    PCT 0.19*  --        - IV Dexamethason 4/03.  - IV Remdesavir 4/03. 2.    Leukopenia: sec to above,     3.     Disposition: in house, Patient agrees with COVID specific treatment, will start IV Remdesavir, pros and cons di

## 2021-04-03 NOTE — ED QUICK NOTES
Orders for admission, patient is aware of plan and ready to go upstairs. Any questions, please call ED RN Layne Chand  at extension [phone].    Type of COVID test sent:  COVID Suspicion level: High    Titratable drug(s) infusing: None  Rate:    LOC at time of t

## 2021-04-03 NOTE — ED INITIAL ASSESSMENT (HPI)
Patient sent from [address] [place] [address] for evaluation. Patient has covid and reportedly oxygen drops to high 80's with exertion.   +cough

## 2021-04-04 PROCEDURE — 94640 AIRWAY INHALATION TREATMENT: CPT

## 2021-04-04 PROCEDURE — 86140 C-REACTIVE PROTEIN: CPT | Performed by: HOSPITALIST

## 2021-04-04 PROCEDURE — 82728 ASSAY OF FERRITIN: CPT | Performed by: HOSPITALIST

## 2021-04-04 PROCEDURE — 85379 FIBRIN DEGRADATION QUANT: CPT | Performed by: HOSPITALIST

## 2021-04-04 PROCEDURE — 83615 LACTATE (LD) (LDH) ENZYME: CPT | Performed by: HOSPITALIST

## 2021-04-04 PROCEDURE — 80053 COMPREHEN METABOLIC PANEL: CPT | Performed by: HOSPITALIST

## 2021-04-04 PROCEDURE — 82962 GLUCOSE BLOOD TEST: CPT

## 2021-04-04 PROCEDURE — 85025 COMPLETE CBC W/AUTO DIFF WBC: CPT | Performed by: HOSPITALIST

## 2021-04-04 RX ORDER — POTASSIUM CHLORIDE 20 MEQ/1
40 TABLET, EXTENDED RELEASE ORAL ONCE
Status: COMPLETED | OUTPATIENT
Start: 2021-04-04 | End: 2021-04-04

## 2021-04-04 NOTE — PROGRESS NOTES
Rosalind Dee is a 54year old female Patient with hist of DM2 on insulin, HTN, HLD, hs of endometrial CA s/p CHRISTY, Morbid obsesity BMI 40, who presents with sob 2/2 COVID PNA.      HPI:    Patient in COVID isolation,   Previous entries noted,   Still on supp (L) 136 - 145 mmol/L    Potassium 3.6 3.5 - 5.1 mmol/L    Chloride 102 98 - 112 mmol/L    CO2 28.0 21.0 - 32.0 mmol/L    Anion Gap 5 0 - 18 mmol/L    BUN 10 7 - 18 mg/dL    Creatinine 0.75 0.55 - 1.02 mg/dL    BUN/CREA Ratio 13.3 10.0 - 20.0    Calcium, To 3.2 (L) 3.4 - 5.0 g/dL   HEMOGLOBIN A1C   Result Value Ref Range    HgbA1C 9.6 (H) <5.7 %    Estimated Average Glucose 229 (H) 68 - 126 mg/dL   COMP METABOLIC PANEL (14)   Result Value Ref Range    Glucose 184 (H) 70 - 99 mg/dL    Sodium 139 136 - 145 mmol Range    WBC 3.6 (L) 4.0 - 11.0 x10(3) uL    RBC 4.17 3.80 - 5.30 x10(6)uL    HGB 12.5 12.0 - 16.0 g/dL    HCT 35.9 35.0 - 48.0 %    MCV 86.1 80.0 - 100.0 fL    MCH 30.0 26.0 - 34.0 pg    MCHC 34.8 31.0 - 37.0 g/dL    RDW-SD 41.9 35.1 - 46.3 fL    RDW 13. 3 No nausea, vomiting, diarrhea, or constipation. :  Denies dysuria or hematuria. NEURO:  Denies headaches, photophobia, weakness or neurologic deficits. DERM:  Denies rashes. MUSCULOSKELETAL:  Denies joint pain, muscle pain.   PSYCHIATRIC:  Denies feel

## 2021-04-04 NOTE — PROGRESS NOTES
[facility] Hospitalist Progress Note     CC: Hospital Follow up    PCP: Kevyn Sanchez MD       Assessment/Plan:     Principal Problem:    Acute respiratory failure due to COVID-19 [facility])  Active Problems:    Pneumonia due to COVID-19 virus    Patient is a 54 filed at 4/4/2021 1415  Gross per 24 hour   Intake 440 ml   Output —   Net 440 ml       Last 3 Weights  04/03/21 0828 : 220 lb (99.8 kg)  03/03/21 0805 : 220 lb (99.8 kg)  11/13/20 1353 : 219 lb (99.3 kg)      Exam    Gen: No acute distress, alert and orie pneumonia/pneumonitis from patient's known diagnosis of COVID-19.   3. Hepatic steatosis. Partially visualized at least moderate hepatic splenomegaly. 4. Lesser incidental findings as above.      Dictated by (CST): Camille Khan MD on 4/03/2021 at 12:05 P

## 2021-04-04 NOTE — PLAN OF CARE
Problem: Patient Centered Care  Goal: Patient preferences are identified and integrated in the patient's plan of care  Description: Interventions:  - What would you like us to know as we care for you?  I am short of breath with exertion  - Provide timely, based on oxygen saturation or ABGs  - Provide Smoking Cessation handout, if applicable  - Encourage broncho-pulmonary hygiene including cough, deep breathe, Incentive Spirometry  - Assess the need for suctioning and perform as needed  - Assess and instruct

## 2021-04-05 PROCEDURE — 85379 FIBRIN DEGRADATION QUANT: CPT | Performed by: HOSPITALIST

## 2021-04-05 PROCEDURE — 80053 COMPREHEN METABOLIC PANEL: CPT | Performed by: HOSPITALIST

## 2021-04-05 PROCEDURE — 82962 GLUCOSE BLOOD TEST: CPT

## 2021-04-05 PROCEDURE — 94640 AIRWAY INHALATION TREATMENT: CPT

## 2021-04-05 PROCEDURE — 82728 ASSAY OF FERRITIN: CPT | Performed by: HOSPITALIST

## 2021-04-05 PROCEDURE — 83615 LACTATE (LD) (LDH) ENZYME: CPT | Performed by: HOSPITALIST

## 2021-04-05 PROCEDURE — 85025 COMPLETE CBC W/AUTO DIFF WBC: CPT | Performed by: HOSPITALIST

## 2021-04-05 PROCEDURE — 86140 C-REACTIVE PROTEIN: CPT | Performed by: HOSPITALIST

## 2021-04-05 NOTE — PLAN OF CARE
Problem: Patient Centered Care  Goal: Patient preferences are identified and integrated in the patient's plan of care  Description: Interventions:  - What would you like us to know as we care for you?  \"I want to go home soon\"  - Provide timely, complet broncho-pulmonary hygiene including cough, deep breathe, Incentive Spirometry  - Assess the need for suctioning and perform as needed  - Assess and instruct to report SOB or any respiratory difficulty  - Respiratory Therapy support as indicated  - Manage/a

## 2021-04-05 NOTE — PROGRESS NOTES
[facility] Hospitalist Progress Note     CC: Hospital Follow up    PCP: Margaux Sales MD       Assessment/Plan:     Principal Problem:    Acute respiratory failure due to COVID-19 [facility])  Active Problems:    Pneumonia due to COVID-19 virus    Patient is a 54 per 24 hour   Intake 200 ml   Output —   Net 200 ml       Last 3 Weights  04/03/21 0828 : 220 lb (99.8 kg)  03/03/21 0805 : 220 lb (99.8 kg)  11/13/20 1353 : 219 lb (99.3 kg)      Exam    Gen: No acute distress, alert and oriented x3,   Heent: NC AT,    Pu lungs.  Extensive bilateral peripheral predominant ground-glass opacities in a pattern consistent with multifocal pneumonia/pneumonitis from patient's known diagnosis of COVID-19.   3. Hepatic steatosis.   Partially visualized at least moderate hepatic sple

## 2021-04-05 NOTE — PROGRESS NOTES
[facility] AND [facility] Infectious Disease  Progress Note    Magi Ruiz Patient Status:  Inpatient    1965 MRN [id number]   Location [facility]5W Attending Pratibha Mendes MD   Hosp Day # 1 PCP Fermin Wyatt MD     Subjective: Remdesivir day #3/5  - Dexamethasone day #3    2.   Abnormal labs due to COVID+ status    Recent Labs   Lab 04/03/21  1030 04/03/21  1055 04/04/21  0647 04/05/21  0544   LDH  --    < > 449* 458*   MICHAEL 643.9*   < > 784.6* 663.0*   DDIMER 0.65*   < > 0.48 0.4

## 2021-04-06 PROCEDURE — 80053 COMPREHEN METABOLIC PANEL: CPT | Performed by: HOSPITALIST

## 2021-04-06 PROCEDURE — 94640 AIRWAY INHALATION TREATMENT: CPT

## 2021-04-06 PROCEDURE — 85025 COMPLETE CBC W/AUTO DIFF WBC: CPT | Performed by: HOSPITALIST

## 2021-04-06 PROCEDURE — 82962 GLUCOSE BLOOD TEST: CPT

## 2021-04-06 PROCEDURE — 86140 C-REACTIVE PROTEIN: CPT | Performed by: HOSPITALIST

## 2021-04-06 PROCEDURE — 82728 ASSAY OF FERRITIN: CPT | Performed by: HOSPITALIST

## 2021-04-06 PROCEDURE — 83615 LACTATE (LD) (LDH) ENZYME: CPT | Performed by: HOSPITALIST

## 2021-04-06 PROCEDURE — 85379 FIBRIN DEGRADATION QUANT: CPT | Performed by: HOSPITALIST

## 2021-04-06 NOTE — PLAN OF CARE
No acute changes. O2 stable on 1L. Low HR noted, sinus dionisio with lowest HR 39. Pt sleeping, asymptomatic. MD notified, no new orders. Pt up independently in the room, calls appropriately. Pt resting comfortably, call light within reach.     Problem: Patien oxygenation  Description: INTERVENTIONS:  - Assess for changes in respiratory status  - Assess for changes in mentation and behavior  - Position to facilitate oxygenation and minimize respiratory effort  - Oxygen supplementation based on oxygen saturation

## 2021-04-06 NOTE — PROGRESS NOTES
[facility] Hospitalist Progress Note     CC: Hospital Follow up    PCP: Khari Galeano MD       Assessment/Plan:     Principal Problem:    Acute respiratory failure due to COVID-19 [facility])  Active Problems:    Pneumonia due to COVID-19 virus    Patient is a 54 filed at 4/5/2021 5994  Gross per 24 hour   Intake 490 ml   Output —   Net 490 ml       Last 3 Weights  04/03/21 0828 : 220 lb (99.8 kg)  03/03/21 0805 : 220 lb (99.8 kg)  11/13/20 1353 : 219 lb (99.3 kg)      Exam    Gen: No acute distress, alert and orie acid  1,000 mg Oral Daily   • enoxaparin  0.5 mg/kg Subcutaneous Daily   • Insulin Aspart Pen  10 Units Subcutaneous TID CC   • Insulin Aspart Pen  1-7 Units Subcutaneous TID CC   • cefTRIAXone  1 g Intravenous Q24H   • dexamethasone  6 mg Oral Daily    Or

## 2021-04-06 NOTE — PROGRESS NOTES
[facility] AND [facility] Infectious Disease  Progress Note    Sandria Ards Patient Status:  Inpatient    1965 MRN [id number]   Location [facility]5W Attending Jose Enrique Chandler MD   Hosp Day # 2 PCP Sebastien Burgos MD     Subjective: day #4  - Remdesivir day #4/5  - Dexamethasone day #4     2.   Abnormal labs due to COVID+ status    Recent Labs   Lab 04/03/21  1030 04/03/21  1055 04/05/21  0544 04/06/21  0522   LDH  --    < > 458* 419*   MICHAEL 643.9*   < > 663.0* 663.2*   DDIMER 0.65*   <

## 2021-04-06 NOTE — PLAN OF CARE
RA O2 sat rest 90%. No c/o SOB. Ambulating on RA, O2 sat 91%. RN did not replace O2 at this time and will monitor.

## 2021-04-07 VITALS
OXYGEN SATURATION: 93 % | SYSTOLIC BLOOD PRESSURE: 158 MMHG | DIASTOLIC BLOOD PRESSURE: 68 MMHG | TEMPERATURE: 98 F | RESPIRATION RATE: 18 BRPM | HEART RATE: 52 BPM

## 2021-04-07 PROBLEM — J96.00 ACUTE RESPIRATORY FAILURE DUE TO COVID-19 (HCC): Status: RESOLVED | Noted: 2021-04-03 | Resolved: 2021-04-07

## 2021-04-07 PROBLEM — U07.1 ACUTE RESPIRATORY FAILURE DUE TO COVID-19 (HCC): Status: RESOLVED | Noted: 2021-04-03 | Resolved: 2021-04-07

## 2021-04-07 PROCEDURE — 80053 COMPREHEN METABOLIC PANEL: CPT | Performed by: HOSPITALIST

## 2021-04-07 PROCEDURE — 82962 GLUCOSE BLOOD TEST: CPT

## 2021-04-07 RX ORDER — DEXAMETHASONE 4 MG/1
4 TABLET ORAL DAILY
Qty: 5 TABLET | Refills: 0 | Status: SHIPPED | OUTPATIENT
Start: 2021-04-08 | End: 2021-04-07

## 2021-04-07 RX ORDER — ALBUTEROL SULFATE 90 UG/1
4 AEROSOL, METERED RESPIRATORY (INHALATION) EVERY 4 HOURS PRN
Qty: 18 G | Refills: 0 | Status: SHIPPED | OUTPATIENT
Start: 2021-04-07

## 2021-04-07 RX ORDER — LISINOPRIL 5 MG/1
5 TABLET ORAL ONCE
Status: COMPLETED | OUTPATIENT
Start: 2021-04-07 | End: 2021-04-07

## 2021-04-07 RX ORDER — ASPIRIN 325 MG
325 TABLET ORAL DAILY
Qty: 30 TABLET | Refills: 0 | Status: SHIPPED | OUTPATIENT
Start: 2021-04-07 | End: 2021-06-04 | Stop reason: ALTCHOICE

## 2021-04-07 RX ORDER — ZINC SULFATE 50(220)MG
220 CAPSULE ORAL DAILY
Qty: 30 CAPSULE | Refills: 0 | Status: SHIPPED | OUTPATIENT
Start: 2021-04-07

## 2021-04-07 RX ORDER — CEFDINIR 300 MG/1
300 CAPSULE ORAL 2 TIMES DAILY
Qty: 6 CAPSULE | Refills: 0 | Status: SHIPPED | OUTPATIENT
Start: 2021-04-07 | End: 2021-04-07

## 2021-04-07 RX ORDER — CHOLECALCIFEROL (VITAMIN D3) 25 MCG
2000 TABLET ORAL DAILY
Qty: 30 TABLET | Refills: 0 | Status: SHIPPED | OUTPATIENT
Start: 2021-04-08

## 2021-04-07 NOTE — PROGRESS NOTES
[facility] AND [facility] Infectious Disease  Progress Note    Jennifer Pane Patient Status:  Inpatient    1965 MRN [id number]   Location [facility]5W Attending Evie Barber MD   Hosp Day # 3 PCP Vijaya Simeon MD     Subjective: 04/05/21  0544 04/06/21  0522   LDH  --    < > 458* 419*   MICHAEL 643.9*   < > 663.0* 663.2*   DDIMER 0.65*   < > 0.42 0.34   CRP 16.70*   < > 5.58* 3.03*   PCT 0.19*  --   --   --     < > = values in this interval not displayed.      3.  DM II  - Patient need

## 2021-04-07 NOTE — PLAN OF CARE
Problem: Patient Centered Care  Goal: Patient preferences are identified and integrated in the patient's plan of care  Description: Interventions:  - What would you like us to know as we care for you?  I have one son  - Provide timely, complete, and accur Patient's Short Term Goal: breathe better    Interventions:   -monitor VS  -supplemental O2 as needed  -IV abx  -follow MD orders  - See additional Care Plan goals for specific interventions  4/7/2021 1822 by Stephanie Dorantes RN  Outcome: Adequate for Disc

## 2021-04-07 NOTE — PLAN OF CARE
Pt currently on RA saturating well. Up ad yarelis. No acute changes overnight. VSS.      Problem: Patient Centered Care  Goal: Patient preferences are identified and integrated in the patient's plan of care  Description: Interventions:  - What would you like u minimize respiratory effort  - Oxygen supplementation based on oxygen saturation or ABGs  - Provide Smoking Cessation handout, if applicable  - Encourage broncho-pulmonary hygiene including cough, deep breathe, Incentive Spirometry  - Assess the need for s

## 2021-04-07 NOTE — PLAN OF CARE
Problem: Patient Centered Care  Goal: Patient preferences are identified and integrated in the patient's plan of care  Description: Interventions:  - What would you like us to know as we care for you?  I have one son  - Provide timely, complete, and accur ABGs  - Provide Smoking Cessation handout, if applicable  - Encourage broncho-pulmonary hygiene including cough, deep breathe, Incentive Spirometry  - Assess the need for suctioning and perform as needed  - Assess and instruct to report SOB or any respirat

## 2021-04-07 NOTE — DIABETES ED
Contacted patient on telephone to discuss elevated A1c value. She reports she was not administering diabetes medication 2-3 weeks prior to hospitalization due to illness.  Reviewed her current A1c value and she indicates this is higher than normal. She has

## 2021-04-07 NOTE — DISCHARGE SUMMARY
General Medicine Discharge Summary     Patient ID:  Ludwin Weems  54year old  7/14/1965    Admit date: 4/3/2021    Discharge date: 4/7/2021    Attending Physician: Tsering Polanco MD     Consults: IP CONSULT TO HOSPITALIST  IP CONSULT TO INFECTIOUS    HTN  - continue ACE      HLD  - continue statin     Hs of stroke  - continue asa     Morbid obseity  - BMI 40.2  - weight loss endevours as outpatient     Endometrial CA  - s/p CHRISTY      Disposition: home    Activity: activity as tolerated  Diet: regul Misc  Check blood glucose levels once per day     OneTouch Verio Flex System w/Device Kit  Check blood glucose levels once per day     OneTouch Verio Strp  Check blood glucose levels once per day           Where to Get Your Medications      These medicatio

## 2021-04-12 ENCOUNTER — TELEMEDICINE (OUTPATIENT)
Dept: CARDIOLOGY CLINIC | Facility: HOSPITAL | Age: 56
End: 2021-04-12
Payer: COMMERCIAL

## 2021-04-12 DIAGNOSIS — E11.9 TYPE 2 DIABETES MELLITUS WITHOUT COMPLICATION, WITHOUT LONG-TERM CURRENT USE OF INSULIN (HCC): ICD-10-CM

## 2021-04-12 DIAGNOSIS — U07.1 PNEUMONIA DUE TO COVID-19 VIRUS: Primary | ICD-10-CM

## 2021-04-12 DIAGNOSIS — J12.82 PNEUMONIA DUE TO COVID-19 VIRUS: Primary | ICD-10-CM

## 2021-04-12 DIAGNOSIS — I10 ESSENTIAL HYPERTENSION: ICD-10-CM

## 2021-04-12 PROCEDURE — 99202 OFFICE O/P NEW SF 15 MIN: CPT | Performed by: NURSE PRACTITIONER

## 2021-04-12 NOTE — PROGRESS NOTES
[facility]  Video Check-In    Duration of the service: 13 minutes    Summary of topics discussed:    Hospitalized 4/3/21-4/7/21 for respiratory failure due to COVID 19. CT chest/CXR with bilateral infiltrates, imflammatory markers elevated, feve Continue all your same medications     Call if having any dizziness, lightheadedness, heart racing, palpitations, chest pain, shortness of breath, coughing, swelling, weight gain, fever, chills or worsening symptoms.       Stay hydrated, drink 64-96 ounce

## 2021-06-04 PROBLEM — E11.9 TYPE 2 DIABETES MELLITUS WITHOUT COMPLICATION, WITHOUT LONG-TERM CURRENT USE OF INSULIN (HCC): Status: RESOLVED | Noted: 2018-01-15 | Resolved: 2021-06-04

## 2021-06-04 PROBLEM — E66.01 CLASS 2 SEVERE OBESITY DUE TO EXCESS CALORIES WITH SERIOUS COMORBIDITY AND BODY MASS INDEX (BMI) OF 38.0 TO 38.9 IN ADULT (HCC): Status: ACTIVE | Noted: 2021-06-04

## 2021-06-04 PROBLEM — Z79.4 TYPE 2 DIABETES MELLITUS WITHOUT COMPLICATION, WITH LONG-TERM CURRENT USE OF INSULIN (HCC): Status: ACTIVE | Noted: 2018-01-15

## 2021-06-04 PROBLEM — E11.9 TYPE 2 DIABETES MELLITUS WITHOUT COMPLICATION, WITH LONG-TERM CURRENT USE OF INSULIN (HCC): Status: ACTIVE | Noted: 2018-01-15

## (undated) DEVICE — DRAPE SHEET LAPAROTOMY

## (undated) DEVICE — CAUTERY BLADE 2IN INS E1455

## (undated) DEVICE — GAMMEX® PI HYBRID SIZE 6.5, STERILE POWDER-FREE SURGICAL GLOVE, POLYISOPRENE AND NEOPRENE BLEND: Brand: GAMMEX

## (undated) DEVICE — 3M™ TEGADERM™ TRANSPARENT FILM DRESSING, 1626W, 4 IN X 4-3/4 IN (10 CM X 12 CM), 50 EACH/CARTON, 4 CARTON/CASE: Brand: 3M™ TEGADERM™

## (undated) DEVICE — IMPERVIOUS STOCKINETTE: Brand: DEROYAL

## (undated) DEVICE — COVER SGL STRL LGHT HNDL BLU

## (undated) DEVICE — SOL  .9 1000ML BTL

## (undated) DEVICE — CULTURE TUBE ANAEROBIC

## (undated) DEVICE — SUTURE ETHILON 4-0 662G

## (undated) DEVICE — MINOR GENERAL: Brand: MEDLINE INDUSTRIES, INC.

## (undated) DEVICE — SPONGE LAP 18X18 XRAY STRL

## (undated) DEVICE — DRAPE SHEET LG

## (undated) DEVICE — OCCLUSIVE GAUZE STRIP OVERWRAP,3% BISMUTH TRIBROMOPHENATE IN PETROLATUM BLEND: Brand: XEROFORM

## (undated) DEVICE — ENCORE® LATEX ACCLAIM SIZE 8, STERILE LATEX POWDER-FREE SURGICAL GLOVE: Brand: ENCORE

## (undated) DEVICE — SUTURE ETHILON 4-0 1667G

## (undated) DEVICE — DRAPE,EXTREMITY,89X128,STERILE: Brand: MEDLINE

## (undated) DEVICE — DRAIN INCS 3/8IN 12IN PNRS RBR

## (undated) DEVICE — REM POLYHESIVE ADULT PATIENT RETURN ELECTRODE: Brand: VALLEYLAB

## (undated) DEVICE — CULTURE COLLECT/TRANSPORT SYS

## (undated) DEVICE — TRAY SKIN PREP PVP-1